# Patient Record
Sex: FEMALE | Race: BLACK OR AFRICAN AMERICAN | NOT HISPANIC OR LATINO | URBAN - NONMETROPOLITAN AREA
[De-identification: names, ages, dates, MRNs, and addresses within clinical notes are randomized per-mention and may not be internally consistent; named-entity substitution may affect disease eponyms.]

---

## 2016-02-08 PROBLEM — Z96.1: Noted: 2019-02-11

## 2016-02-08 PROBLEM — H26.493: Noted: 2019-02-11

## 2016-02-08 PROBLEM — H04.123: Noted: 2019-02-11

## 2019-02-11 ENCOUNTER — IMPORTED ENCOUNTER (OUTPATIENT)
Dept: URBAN - NONMETROPOLITAN AREA CLINIC 1 | Facility: CLINIC | Age: 79
End: 2019-02-11

## 2019-02-11 PROCEDURE — 92014 COMPRE OPH EXAM EST PT 1/>: CPT

## 2019-02-11 PROCEDURE — 92015 DETERMINE REFRACTIVE STATE: CPT

## 2019-02-11 NOTE — PATIENT DISCUSSION
*Diabetic S  Retinopathy:. -  Discussed findings of exam in detail with the patient. -  discussed the risk of diabetic damage of the retina with potential vision loss and the importance of routine follow-up. 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 mild progression-  We discussed the patients posterior capsular opacity(s) in detail with the patient. -  The patient/ physician elected to wait on surgery at this time. -  The patient was informed of the symptoms related to progression of the opacification. *DRY EYES:.-  Discussed findings of exam in detail with the patient. -  Discussed the chronic nature and treatment options with the patient. -  The use of preserved artificial tears  was discussed with patient. -  Omega 3s in the tryglyceride form. CONT TEARS PRN; 's Notes: OK TO REFILL Luciana Tovar

## 2020-03-03 ENCOUNTER — IMPORTED ENCOUNTER (OUTPATIENT)
Dept: URBAN - NONMETROPOLITAN AREA CLINIC 1 | Facility: CLINIC | Age: 80
End: 2020-03-03

## 2020-03-03 PROCEDURE — 92015 DETERMINE REFRACTIVE STATE: CPT

## 2020-03-03 PROCEDURE — 92014 COMPRE OPH EXAM EST PT 1/>: CPT

## 2020-03-03 NOTE — PATIENT DISCUSSION
*Diabetic S  Retinopathy:. -  Discussed findings of exam in detail with the patient. -  discussed the risk of diabetic damage of the retina with potential vision loss and the importance of routine follow-up. 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 mild progression-  We discussed the patients posterior capsular opacity(s) in detail with the patient. -  The patient/ physician elected to wait on surgery at this time. -  The patient was informed of the symptoms related to progression of the opacification. *DRY EYES:.-  Discussed findings of exam in detail with the patient. -  Discussed the chronic nature and treatment options with the patient. -  The use of preserved artificial tears  was discussed with patient. -  Omega 3s in the tryglyceride form. CONT TEARS PRN; Dr's Notes: OK TO REFILL Ethan Wilkins

## 2022-04-15 ASSESSMENT — VISUAL ACUITY
OS_SC: 20/20-1
OS_CC: J1
OD_SC: 20/20
OS_CC: 20/25
OS_CC: J1
OD_CC: J1
OD_CC: 20/25
OD_CC: J1

## 2022-04-15 ASSESSMENT — TONOMETRY
OS_IOP_MMHG: 14
OD_IOP_MMHG: 14
OS_IOP_MMHG: 15
OD_IOP_MMHG: 15